# Patient Record
Sex: MALE | Race: OTHER | HISPANIC OR LATINO | URBAN - METROPOLITAN AREA
[De-identification: names, ages, dates, MRNs, and addresses within clinical notes are randomized per-mention and may not be internally consistent; named-entity substitution may affect disease eponyms.]

---

## 2020-12-07 ENCOUNTER — OUTPATIENT (OUTPATIENT)
Dept: OUTPATIENT SERVICES | Facility: HOSPITAL | Age: 25
LOS: 1 days | End: 2020-12-07
Payer: MEDICARE

## 2020-12-07 DIAGNOSIS — Z11.59 ENCOUNTER FOR SCREENING FOR OTHER VIRAL DISEASES: ICD-10-CM

## 2020-12-07 PROCEDURE — U0003: CPT

## 2020-12-08 DIAGNOSIS — Z11.59 ENCOUNTER FOR SCREENING FOR OTHER VIRAL DISEASES: ICD-10-CM

## 2020-12-08 LAB — SARS-COV-2 RNA SPEC QL NAA+PROBE: SIGNIFICANT CHANGE UP

## 2025-04-06 ENCOUNTER — EMERGENCY (EMERGENCY)
Facility: HOSPITAL | Age: 30
LOS: 0 days | Discharge: HOME | End: 2025-04-06
Attending: EMERGENCY MEDICINE
Payer: SELF-PAY

## 2025-04-06 VITALS
OXYGEN SATURATION: 100 % | DIASTOLIC BLOOD PRESSURE: 81 MMHG | HEART RATE: 58 BPM | TEMPERATURE: 98 F | SYSTOLIC BLOOD PRESSURE: 140 MMHG | RESPIRATION RATE: 18 BRPM | WEIGHT: 154.1 LBS

## 2025-04-06 VITALS
OXYGEN SATURATION: 100 % | TEMPERATURE: 97 F | HEART RATE: 58 BPM | DIASTOLIC BLOOD PRESSURE: 73 MMHG | SYSTOLIC BLOOD PRESSURE: 125 MMHG | RESPIRATION RATE: 18 BRPM

## 2025-04-06 DIAGNOSIS — N50.811 RIGHT TESTICULAR PAIN: ICD-10-CM

## 2025-04-06 DIAGNOSIS — N43.3 HYDROCELE, UNSPECIFIED: ICD-10-CM

## 2025-04-06 LAB
APPEARANCE UR: CLEAR — SIGNIFICANT CHANGE UP
BILIRUB UR-MCNC: NEGATIVE — SIGNIFICANT CHANGE UP
COLOR SPEC: SIGNIFICANT CHANGE UP
DIFF PNL FLD: NEGATIVE — SIGNIFICANT CHANGE UP
GLUCOSE UR QL: NEGATIVE MG/DL — SIGNIFICANT CHANGE UP
KETONES UR-MCNC: ABNORMAL MG/DL
LEUKOCYTE ESTERASE UR-ACNC: NEGATIVE — SIGNIFICANT CHANGE UP
NITRITE UR-MCNC: NEGATIVE — SIGNIFICANT CHANGE UP
PH UR: 5.5 — SIGNIFICANT CHANGE UP (ref 5–8)
PROT UR-MCNC: SIGNIFICANT CHANGE UP MG/DL
SP GR SPEC: 1.03 — SIGNIFICANT CHANGE UP (ref 1–1.03)
UROBILINOGEN FLD QL: 1 MG/DL — SIGNIFICANT CHANGE UP (ref 0.2–1)

## 2025-04-06 PROCEDURE — 76870 US EXAM SCROTUM: CPT | Mod: 26

## 2025-04-06 PROCEDURE — 93975 VASCULAR STUDY: CPT | Mod: 26

## 2025-04-06 PROCEDURE — 93975 VASCULAR STUDY: CPT

## 2025-04-06 PROCEDURE — 87491 CHLMYD TRACH DNA AMP PROBE: CPT

## 2025-04-06 PROCEDURE — 99285 EMERGENCY DEPT VISIT HI MDM: CPT | Mod: 25

## 2025-04-06 PROCEDURE — 76870 US EXAM SCROTUM: CPT

## 2025-04-06 PROCEDURE — 99284 EMERGENCY DEPT VISIT MOD MDM: CPT

## 2025-04-06 PROCEDURE — 81003 URINALYSIS AUTO W/O SCOPE: CPT

## 2025-04-06 PROCEDURE — 87591 N.GONORRHOEAE DNA AMP PROB: CPT

## 2025-04-06 RX ORDER — ACETAMINOPHEN 500 MG/5ML
650 LIQUID (ML) ORAL ONCE
Refills: 0 | Status: COMPLETED | OUTPATIENT
Start: 2025-04-06 | End: 2025-04-06

## 2025-04-06 RX ORDER — IBUPROFEN 200 MG
1 TABLET ORAL
Qty: 20 | Refills: 0
Start: 2025-04-06 | End: 2025-04-10

## 2025-04-06 RX ADMIN — Medication 650 MILLIGRAM(S): at 11:18

## 2025-04-06 NOTE — ED STATDOCS - PHYSICAL EXAMINATION
Constitutional: NAD AOx3  Eyes: PERRL EOMI  Head: Normocephalic atraumatic  Mouth: MMM  Cardiac: regular rate and rhythm  Resp: Lungs CTAB  GI: Abd s/nd/nt  Neuro: CN2-12 grossly intact, MONIQUE x 4  Skin: No visible rashes   : Un-Circumcised penis, external anatomy appears normal. No urethral discharge or bleeding. R epididymis slightly enlarged with tenderness, L testicle nontender, no evidence of torsion

## 2025-04-06 NOTE — ED STATDOCS - PROGRESS NOTE DETAILS
Jinny: US shows: Right hydrocele: Small. Question of an appendage of 2 mm. UA negative. will give pt rx for motrin and uro fu. return if worsening

## 2025-04-06 NOTE — ED STATDOCS - OBJECTIVE STATEMENT
ID #[id number]  Pt is a 29y male who presents to the ED for intermittent R sided testicular pain for the last few weeks. Denies dysuria, penile pain. Does not have a h/o STDs, does not suspect he has one today. Has not taken any medications for pain.

## 2025-04-06 NOTE — ED STATDOCS - PATIENT PORTAL LINK FT
You can access the FollowMyHealth Patient Portal offered by [facility] by registering at the following website: [email or web address]. By joining [employer]’s FollowMyHealth portal, you will also be able to view your health information using other applications (apps) compatible with our system.

## 2025-04-06 NOTE — ED STATDOCS - NSFOLLOWUPINSTRUCTIONS_ED_ALL_ED_FT
Seguimiento con el urólogo. La ecografía muestra: Hidrocele derecho  [place] motrin según sea necesario para el dolor  Cualquier empeoramiento de los síntomas o la aparición de nuevos síntomas preocupantes regresan al servicio de urgencias    Follow up with the urologist. Ultrasound shows: Right hydrocele   Take motrin as needed for pain  Any worsening of symptoms or new concerning symptoms return to the ED      Hidrocele en adultos  Hydrocele, Adult  A testicle, showing a buildup of fluid in the scrotum.  El hidrocele es la acumulación de líquido en la bolsa de piel flácida que aloja los testículos (escroto). Puede ocurrir en lali o en ambos testículos. [place] puede ocurrir debido a que:  La cantidad de líquido producido en el escroto no es absorbido por el gabriel del cuerpo.  Se acumula líquido proveniente del abdomen en el escroto. Normalmente, los testículos se olayinka en el abdomen y luego bajan al escroto antes del nacimiento. El tubo por el que los testículos bajan al escroto generalmente se pablo después de que esto ocurre. Si el tubo no se pablo, puede acumularse líquido del abdomen en el escroto. [place] no es muy frecuente en los adultos.  ¿Cuáles son las causas?  El hidrocele puede deberse a lo siguiente:  Zuleika lesión en el escroto.  Zuleika infección.  Disminución del flujo sanguíneo hacia el escroto.  La torsión de un testículo (torsión testicular).  Un defecto congénito.  Un tumor o cáncer en el testículo.  A veces, la causa es desconocida.    ¿Cuáles son los signos o síntomas?  El hidrocele se siente ozzie un globo lleno de agua. También puede sentirse pesado. Otros síntomas pueden incluir los siguientes:  Hinchazón del escroto. Esta puede disminuir al acostarse. También puede notar más hinchazón por la noche que en la mañana. [place] se denomina hidrocele comunicante, en el que el líquido del escroto vuelve a ingresar en la cavidad abdominal cuando la posición del escroto cambia.  Hinchazón de la nadir.  Molestia leve en el escroto.  Dolor. [place] puede aparecer si el hidrocele fue causado por zuleika infección o zuleika torsión. Cuanto más jose sea el hidrocele, más probabilidades hay de que tenga dolor. La hinchazón también puede causar dolor.  ¿Cómo se diagnostica?  Esta afección se puede diagnosticar en función de parth antecedentes médicos y de un examen físico. También pueden hacerle pruebas, que incluyen las siguientes:  Pruebas de diagnóstico por imágenes, ozzie zuleika ecografía.  Estudio de transiluminación. Ka[place] estudio se realiza en zuleika habitación oscura, donde se coloca zuleika alex en la piel del escroto. El líquido transparente no impide el paso jian, y el escroto se iluminará. [place] ayuda al médico a distinguir el hidrocele de un tumor.  Análisis de rosana u orina.  ¿Cómo se trata?  La mayoría de los hidroceles desaparecen sin tratamiento. Si no tiene molestias ni dolor, el médico puede sugerir un seguimiento minucioso de dixon afección hasta que se manifiesten síntomas o la afección desaparezca. [place] se denomina observación cautelosa o espera vigilante. Si se necesita tratamiento, kaelyn puede incluir lo siguiente:  Tratar la afección subyacente. [place] puede incluir la jb de un antibiótico para tratar zuleika infección.  Realizar zuleika cirugía para evitar que se acumule líquido en el escroto.  Realizar zuleika cirugía para drenar el líquido. La cirugía puede incluir lo siguiente:  Hidrocelectomía. Para kaelyn procedimiento, se realiza zuleika incisión en el escroto para extraer el líquido.  Aspiración con aguja. Se utiliza zuleika aguja para drenar el líquido. Sin embargo, la acumulación de líquido regresará rápidamente y puede provocar zuleika infección en el escroto. [place] no se hace con frecuencia.  Siga estas indicaciones en dixon casa:  Medicamentos    Use los medicamentos de venta jimmie y los recetados solamente ozzie se lo haya indicado el médico.  Si le recetaron un antibiótico, tómelo ozzie se lo haya indicado el médico. No deje de brijesh el antibiótico aunque comience a sentirse mejor.  Indicaciones generales    Vigile el hidrocele para detectar cualquier cambio.  Concurra a todas las visitas de seguimiento. [place] es importante.  Comuníquese con un médico si:  Observa cambios en el hidrocele.  La hinchazón del escroto o de la nadir empeora.  El hidrocele se enrojece, se endurece, le causa dolor o se vuelve sensible al tacto.  Tiene fiebre.  Solicite ayuda de inmediato si:  Tiene un dolor intenso nuevo, o dixon dolor empeora.  Tiene escalofríos.  Tiene fiebre miley.  Resumen  El hidrocele es la acumulación de líquido en la bolsa de piel flácida que aloja los testículos (escroto).  El hidrocele puede causar hinchazón, molestias y dolor.  Por lo general, la causa del hidrocele en los adultos posiblemente se desconozca. Sin embargo, algunas veces es causado por zuleika infección o la torsión de un testículo.  Los hidroceles a menudo desaparecen por sí solos. Si un hidrocele causa dolor, podría ser necesario tratar la causa subyacente para aliviar el dolor.  Esta información no tiene ozzie fin reemplazar el consejo del médico. Asegúrese de hacerle al médico cualquier pregunta que tenga.

## 2025-04-07 LAB
C TRACH RRNA SPEC QL NAA+PROBE: SIGNIFICANT CHANGE UP
N GONORRHOEA RRNA SPEC QL NAA+PROBE: SIGNIFICANT CHANGE UP
SPECIMEN SOURCE: SIGNIFICANT CHANGE UP